# Patient Record
Sex: FEMALE | Race: WHITE
[De-identification: names, ages, dates, MRNs, and addresses within clinical notes are randomized per-mention and may not be internally consistent; named-entity substitution may affect disease eponyms.]

---

## 2018-05-12 ENCOUNTER — HOSPITAL ENCOUNTER (EMERGENCY)
Dept: HOSPITAL 25 - UCCORT | Age: 58
Discharge: HOME | End: 2018-05-12
Payer: COMMERCIAL

## 2018-05-12 VITALS — DIASTOLIC BLOOD PRESSURE: 82 MMHG | SYSTOLIC BLOOD PRESSURE: 122 MMHG

## 2018-05-12 DIAGNOSIS — N30.00: Primary | ICD-10-CM

## 2018-05-12 DIAGNOSIS — Z88.2: ICD-10-CM

## 2018-05-12 PROCEDURE — 99212 OFFICE O/P EST SF 10 MIN: CPT

## 2018-05-12 PROCEDURE — 81003 URINALYSIS AUTO W/O SCOPE: CPT

## 2018-05-12 PROCEDURE — G0463 HOSPITAL OUTPT CLINIC VISIT: HCPCS

## 2018-05-12 NOTE — UC
Complaint Female HPI





- HPI Summary


HPI Summary: 





58 yo WF p/w suprapubic pressure x 2-3 days, associated with urinary frequency 

and urgency, denies f/c





- History Of Current Complaint


Chief Complaint: UCGU


Stated Complaint: URINARY


Time Seen by Provider: 05/12/18 09:02


Hx Obtained From: Patient


Hx Last Menstrual Period: n/a


Pregnant?: Yes


Onset/Duration: Sudden Onset


Timing: Constant


Severity Initially: Moderate


Severity Currently: Moderate


Pain Intensity: 5


Pain Scale Used: 0-10 Numeric


Character: Sharp





- Allergies/Home Medications


Allergies/Adverse Reactions: 


 Allergies











Allergy/AdvReac Type Severity Reaction Status Date / Time


 


Sulfa (Sulfonamide Allergy  Unknown Verified 05/12/18 08:42





Antibiotics)   Reaction  





   Details  














PMH/Surg Hx/FS Hx/Imm Hx


Previously Healthy: Yes


Cardiovascular History: Hypertension





- Surgical History


Surgical History: Yes


Surgery Procedure, Year, and Place: Right Knee Arthroscopy, 2017, [place]; 

Left Shoulder, 2016, [place]; Tonsillectomy as a child





- Family History


Known Family History: Positive: Cardiac Disease, Hypertension





- Social History


Alcohol Use: None


Substance Use Type: None


Smoking Status (MU): Never Smoked Tobacco





Review of Systems


Constitutional: Negative


Skin: Negative


Eyes: Negative


ENT: Negative


Respiratory: Negative


Cardiovascular: Negative


Gastrointestinal: Negative


Genitourinary: Frequency, Urgency, Other - suprapubic pain


Motor: Negative


Neurovascular: Negative


Musculoskeletal: Negative


Neurological: Negative


Psychological: Negative


All Other Systems Reviewed And Are Negative: Yes





Physical Exam


Triage Information Reviewed: Yes


Appearance: Well-Appearing


Vital Signs: 


 Initial Vital Signs











Temp  37.2 C   05/12/18 08:39


 


Pulse  82   05/12/18 08:39


 


Resp  16   05/12/18 08:39


 


BP  122/82   05/12/18 08:39


 


Pulse Ox  99   05/12/18 08:39











Eye Exam: Normal


ENT Exam: Normal


Dental Exam: Normal


Neck: Positive: Supple


Respiratory Exam: Normal


Cardiovascular Exam: Normal


Cardiovascular: Positive: RRR


Abdomen Description: Positive: Other: - suprapubic tenderness.  Negative: CVA 

Tenderness (R), CVA Tenderness (L)


Musculoskeletal Exam: Normal


Neurological Exam: Normal


Neurological: Positive: Alert


Psychological Exam: Normal


Skin Exam: Normal





 Complaint Female Dx





- Course


Course Of Treatment: UA - 3+ blood and signs of UTI, will tx with macrobid





- Differential Dx/Diagnosis


Provider Diagnoses: acute cystitis





Discharge





- Sign-Out/Discharge


Documenting (check all that apply): Discharge/Admit/Transfer





- Discharge Plan


Condition: Stable


Disposition: HOME


Prescriptions: 


Nitrofurantoin Monohyd/M-Cryst [Macrobid 100 mg Capsule] 100 mg PO BID 7 Days #

14 cap


Patient Education Materials:  Urinary Tract Infection in Women (ED)


Referrals: 


Clotilde Walters MD [Primary Care Provider] - 





- Billing Disposition and Condition


Condition: STABLE


Disposition: HOME Pt states yesterday she started coughing up bright red blood. Then states that this evening she was riding in a car and began coughing bright red blood again. Denies SOA, Cp or dizziness. Family at bedside.      Fatou Saldivar RN  05/11/18 [id number]

## 2018-05-20 ENCOUNTER — HOSPITAL ENCOUNTER (EMERGENCY)
Dept: HOSPITAL 25 - UCCORT | Age: 58
Discharge: HOME | End: 2018-05-20
Payer: COMMERCIAL

## 2018-05-20 VITALS — SYSTOLIC BLOOD PRESSURE: 139 MMHG | DIASTOLIC BLOOD PRESSURE: 52 MMHG

## 2018-05-20 DIAGNOSIS — Z88.2: ICD-10-CM

## 2018-05-20 DIAGNOSIS — L25.9: Primary | ICD-10-CM

## 2018-05-20 DIAGNOSIS — R31.29: ICD-10-CM

## 2018-05-20 PROCEDURE — 81003 URINALYSIS AUTO W/O SCOPE: CPT

## 2018-05-20 PROCEDURE — 99212 OFFICE O/P EST SF 10 MIN: CPT

## 2018-05-20 PROCEDURE — G0463 HOSPITAL OUTPT CLINIC VISIT: HCPCS

## 2018-05-20 PROCEDURE — 96372 THER/PROPH/DIAG INJ SC/IM: CPT

## 2018-05-20 NOTE — UC
Skin Complaint HPI





- HPI Summary


HPI Summary: 





58 yo female with pruritic rash x 1 day


worse on arms/legs


has been doing yard work





seen here 2 weeks ago with hematuria











- History of Current Complaint


Chief Complaint: UCRash


Time Seen by Provider: 05/20/18 09:22


Stated Complaint: SKIN COMPLAINT


Hx Obtained From: Patient


Hx Last Menstrual Period: n/a


Onset/Duration: Gradual Onset, Lasting Hours


Timing: Constant


Onset Severity: Mild


Current Severity: Mild


Pain Intensity: 0


Pain Scale Used: 0-10 Numeric


Location: Diffuse


Character: Pruritus, Redness, Raised


Aggravating Factor(s): Nothing


Alleviating Factor(s): Nothing





- Allergy/Home Medications


Allergies/Adverse Reactions: 


 Allergies











Allergy/AdvReac Type Severity Reaction Status Date / Time


 


Sulfa (Sulfonamide Allergy  Unknown Verified 05/20/18 09:09





Antibiotics)   Reaction  





   Details  











Home Medications: 


 Home Medications





Triamterene/HCTZ 37.5-25 MG* [Dyazide CAP*] 1 cap PO DAILY 05/20/18 [History 

Confirmed 05/20/18]











Review of Systems


Constitutional: Negative


Skin: Rash


Eyes: Negative


ENT: Negative


Respiratory: Negative


Cardiovascular: Negative


Gastrointestinal: Negative


Genitourinary: Negative


Motor: Negative


Neurovascular: Negative


Musculoskeletal: Negative


Neurological: Negative


Psychological: Negative


Is Patient Immunocompromised?: No


All Other Systems Reviewed And Are Negative: Yes





PMH/Surg Hx/FS Hx/Imm Hx


Previously Healthy: Yes





- Surgical History


Surgical History: Yes


Surgery Procedure, Year, and Place: Right Knee Arthroscopy, 2017, [place]; 

Left Shoulder, 2016, [place]; Tonsillectomy as a child





- Family History


Known Family History: Positive: Cardiac Disease, Hypertension





- Social History


Alcohol Use: None


Substance Use Type: None


Smoking Status (MU): Never Smoked Tobacco





Physical Exam


Triage Information Reviewed: Yes


Appearance: Well-Appearing, No Pain Distress, Well-Nourished


Vital Signs: 


 Initial Vital Signs











Temp  97.9 F   05/20/18 09:12


 


Pulse  99   05/20/18 09:12


 


Resp  18   05/20/18 09:12


 


BP  139/52   05/20/18 09:12


 


Pulse Ox  97   05/20/18 09:12











Vital Signs Reviewed: Yes


Eyes: Positive: Conjunctiva Clear


ENT: Positive: Other - no intraoral lesions.  Negative: Hearing grossly normal, 

Nasal congestion, Nasal drainage, Trismus, Muffled voice, Dental tenderness


Neck: Positive: Supple, Nontender


Respiratory: Positive: Lungs clear, Normal breath sounds, No respiratory 

distress


Cardiovascular: Positive: RRR, No Murmur


Musculoskeletal: Positive: ROM Intact, No Edema


Neurological: Positive: Alert


Psychological Exam: Normal


Skin Exam: Other - muliple red papules arms and legs some in linear array





Course/Dx





- Course


Course Of Treatment: ua-(+1) rbcs





- Diagnoses


Provider Diagnoses: contact dermatitis.  microscopic hematuria





Discharge





- Sign-Out/Discharge


Documenting (check all that apply): Discharge/Admit/Transfer





- Discharge Plan


Condition: Stable


Disposition: HOME


Patient Education Materials:  Contact Dermatitis (ED), Hematuria (ED)


Referrals: 


Clotilde Walters MD [Primary Care Provider] - 4 Days





- Billing Disposition and Condition


Condition: STABLE


Disposition: HOME

## 2019-11-20 ENCOUNTER — HOSPITAL ENCOUNTER (EMERGENCY)
Dept: HOSPITAL 25 - UCCORT | Age: 59
Discharge: HOME | End: 2019-11-20
Payer: COMMERCIAL

## 2019-11-20 VITALS — SYSTOLIC BLOOD PRESSURE: 131 MMHG | DIASTOLIC BLOOD PRESSURE: 62 MMHG

## 2019-11-20 DIAGNOSIS — F41.9: ICD-10-CM

## 2019-11-20 DIAGNOSIS — Z88.2: ICD-10-CM

## 2019-11-20 DIAGNOSIS — R05: Primary | ICD-10-CM

## 2019-11-20 DIAGNOSIS — R06.02: ICD-10-CM

## 2019-11-20 DIAGNOSIS — J34.89: ICD-10-CM

## 2019-11-20 DIAGNOSIS — Z79.899: ICD-10-CM

## 2019-11-20 PROCEDURE — 71046 X-RAY EXAM CHEST 2 VIEWS: CPT

## 2019-11-20 PROCEDURE — 99212 OFFICE O/P EST SF 10 MIN: CPT

## 2019-11-20 PROCEDURE — G0463 HOSPITAL OUTPT CLINIC VISIT: HCPCS

## 2019-11-20 NOTE — XMS REPORT
Continuity of Care Document (CCD)

 Created on:October 15, 2019



Patient:Jennie Garcia

Sex:Female

:1960

External Reference #:[id number]





Demographics







 Address  [address] [place] [address]

 

 Home Phone  [phone]

 

 Mobile Phone  [phone]

 

 Email Address  [email or web address]

 

 Preferred Language  en

 

 Marital Status  Not  or 

 

 Oriental orthodox Affiliation  Unknown

 

 Race  White

 

 Ethnic Group  Not  or 









Author







 Name  Clotilde Walters MD

 

 Address  [address] [place] [id number]









Care Team Providers







 Name  Role  Phone

 

 Clotilde Walters MD - Family Medicine  Care Team Information   [phone]
[phone]









Problems







 Active Problems  Provider  Date

 

 Benign essential hypertension    Onset: 2016

 

 Edema  Clotilde Walters MD  Onset: 2015

 

 Skin sensation disturbance  Clotilde Walters MD  Onset: 2015

 

 Psoriasis  Clotilde Walters MD  Onset: 2016

 

 Overweight  Clotilde Walters MD  Onset: 2016

 

 Derangement of medial meniscus  Martin Hernandez M.D.  Onset: 2017

 

 Old tear of posterior horn of medial  Arlene Romero PA  Onset: 2017



 meniscus    

 

 Localized, primary osteoarthritis  Arlene Romero PA  Onset: 2017

 

 Hematuria syndrome  Dick Doss FNP  Onset: 2018









 Note: Lab: 18 - Urine Dipstick Lab: 18 - Urine Culture negative









 Verruca vulgaris  Clotilde Walters MD  Onset: 2018

 

 Screening for malignant neoplasm of colon  Pasha Campos MD  Onset: 2018

 

 Constipation  Pasha Campos MD  Onset: 2018

 

 Other hemorrhoids  Pasha Campos MD  Onset: 2018

 

 Adjustment disorder with depressed mood  Clotilde Walters MD  Onset: 2019







Social History







 Type  Date  Description  Comments

 

 Birth Sex    Unknown  

 

 Tobacco Use  Start: Unknown  Never Smoked Cigarettes  

 

 Smoking Status  Reviewed: 10/15/19  Never Smoked Cigarettes  

 

 ETOH Use    Currently consumes alcohol  < 2 dr/yr

 

 Tobacco Use  Start: Unknown  Patient has never smoked  

 

 Recreational Drug Use    Never Used Drugs  

 

 Exercise Type/Frequency    Does not exercise  







Allergies, Adverse Reactions, Alerts







 Active Allergies  Reaction  Severity  Comments  Date

 

 Sulfa Drugs        2015







Medications







 Active Medications  SIG  Qnty  Indications  Ordering Provider  Date

 

 Fluoxetine HCL  1 by mouth every  30caps  F43.21  Clotilde Waltres,  2019



 (PMDD)  night      MD  



       20mg Capsules          



           

 

 Lorazepam  1 tablet by mouth  60tabs    Clotilde Walters,  2019



          1mg Tablets  3x/day as needed      MD  



   for acute grief        



   reaction Reference        



   #: [id number]        

 

 Triamterene/Hydrochl  take one tablet by  90tabs  R03.0  Clotilde Walters,  



 orothiazide  mouth once daily      MD  



            75-50mg  in the morning        



 Tablets          



           

 

 Ibuprofen  take one tablet by    S46.012A  Unknown  



          800mg  mouth every 8        



 Tablets  hours as needed        



           









 History Medications









 Amoxicillin/Clavulanate  1 by mouth  20tabs    Clotilde Walters,  2019 -



 Potassium  twice a day      MD  10/11/2019



 875-125mg Tablets          



           

 

 Amoxicillin/Clavulanate  1 by mouth  20tabs    Clotilde Walters,  2019 -



 Potassium  twice a day      MD  2019



 875-125mg Tablets          



           







Immunizations







 Description

 

 No Information Available







Vital Signs







 Date  Vital  Result  Comment

 

 10/11/2019  9:09am  BP Systolic  120 mmHg  









 BP Diastolic  74 mmHg  

 

 Body Temperature  98.3 F  

 

 Heart Rate  83 /min  

 

 Respiratory Rate  18 /min  

 

 Height  64.75 inches  5'4.75"

 

 Weight  234.00 lb  

 

 BMI (Body Mass Index)  39.2 kg/m2  

 

 BSA (Body Surface Area)  2.11 m2  

 

 Ideal body weight in kilograms  56 kg  

 

 O2 % BldC Oximetry  97 %  









 2019  3:11pm  BP Systolic  126 mmHg  









 BP Diastolic  80 mmHg  

 

 Height  65.25 inches  5'5.25"

 

 Weight  233.00 lb  

 

 BMI (Body Mass Index)  38.5 kg/m2  

 

 BSA (Body Surface Area)  2.12 m2  

 

 Ideal body weight in kilograms  57 kg  







Results







 Test  Date  Facility  Test  Result  H/L  Range  Note

 

 Urine Dipstick  10/11/2019  [facility]  Ua Color  yellow    Yellow  









 Ua Clarity  clear    Clear  

 

 [facility]a Leuko  negative    Negative  

 

 Ua Nitrite  negative    Negative  

 

 Ua Urobilinogen  0.2    0.2 - 1.0 E.U./dL  

 

 U[facility] Protein  negative    Negative  

 

 Ua PH  7.5    6.5-7.5  

 

 Ua Blood  negative    Negative  

 

 Ua Specific Gravity  1.015    1.010-1.030  

 

 Ua Ketones  negative    Negative  

 

 Ua Bilirubin  negative    Negative  

 

 Ua Glucose  negative    Negative  









 Urine Dipstick  2019  [facility]  Ua Leuko  -    Negative  









 Ua Nitrite  -    Negative  

 

 Ua Urobilinogen  .2    0.2 - 1.0 E.U./dL  

 

 Ua Protein  -    Negative  

 

 Ua PH  7    6.5-7.5  

 

 Ua Blood  1+  High  Negative  

 

 Ua Specific Gravity  1.015    1.010-1.030  

 

 Ua Ketones  -    Negative  

 

 Ua Bilirubin  -    Negative  

 

 Ua Glucose  -    Negative  







Procedures







 Date  Code  Description  Status

 

 10/11/2019  87456570  Mammogram  Completed

 

 2019  [id number]  Colonoscopy  Completed

 

 2019  53810  Brief Emotional/Behav Assessment W/ Scoring Doc Per  
Completed



     Standard Inst  

 

 2018  [id number]  Bone Mineral Density Test  Completed







Medical Devices







 Description

 

 No Information Available







Encounters







 Type  Date  Location  Provider  Dx  Diagnosis

 

 Office Visit  10/11/2019  [facility]  Clotilde Walters,  Z00.00  Encntr for 
general



   9:00a  Darshan VALLE MD    adult medical exam



           w/o abnormal



           findings









 F43.21  Adjustment disorder with depressed mood

 

 K57.30  Dvrtclos of lg int w/o perforation or abscess w/o bleeding

 

 N60.02  Solitary cyst of left breast









 Office Visit  2019  3:00p  [facility]  Clotilde Walters  F43.21  
Adjustment



     Darshan VALLE MD    disorder with



           depressed mood







Assessments







 Date  Code  Description  Provider

 

 10/11/2019  Z00.00  Encounter for general adult medical examination  Clotilde Walters MD



     without abnormal findings  

 

 10/11/2019  F43.21  Adjustment disorder with depressed mood  Clotilde Walters MD

 

 10/11/2019  K57.30  Diverticulosis of large intestine without  Clotilde Walters MD



     perforation or abscess without bleeding  

 

 10/11/2019  N60.02  Solitary cyst of left breast  Clotilde Walters MD

 

 2019  F43.21  Adjustment disorder with depressed mood  Clotilde Walters MD







Plan of Treatment

Future Appointment(s):2020  8:30 am - Clotilde Walters MD at [facility] Darshan VALLE10/2019 - Clotilde Walters MDZ00.00 Encounter for general 
adult medical examination without abnormal findingsComments:INCREASE EXERCISE; 
WORK TOWARDS IDEAL WEIGHT;DAILY SUNSCREEN FOR SKIN CANCER PREVENTION;CALCIUM 
INTAKE DISCUSSEDPAP/PELVIC : UP TO DATE; MAMMOGRAM AND SONOGRAM LEFT BREAST 
WILL BE ORDERED FOR AFTER 10/19/19; LABS  ORDERED FASTING;TDAP [facility] 
PROXY: IN ZUYYLH71.21 Adjustment disorder with depressed moodComments:YOU ARE 
BETTER; I'M GLAD YOU WENT TO [place]. PLAN MORE TRIPS AND GO!LORAZEPAM AS 
NEEDED.Follow up:4 MOK57.30 Diverticulosis of large intestine without 
perforation or abscess without bleedingComments:FROM YOUR COLONOSCOPY;BASICALLY
, AVOID BPAWZYLEWYFCJ21.02 Solitary cyst of left breastComments:PLAN AS ABOVE



Functional Status







 Functional Condition  Comment  Date  Status

 

 Glasses      Active







Mental Status







 Description

 

 No Information Available







Referrals







 Description

 

 No Information Available

## 2019-11-20 NOTE — UC
Respiratory Complaint HPI





- HPI Summary


HPI Summary: 


Patient is a 59-year-old female presenting with complaint of SOB at night for 

the past 3 nights and cough x1 month. Patient states she wakes up and feels 

like she "cant catch her breath." Notes that it lasts a few minutes and 

improves some with mouth breathing. Notes mild dry cough. Notes post nasal drip 

and h/o seasonal allergies. Denies SOB during the day. Denies wheezing. Denies 

chest pain. Denies fever and chills. Denies decreased appetite and fatigue. 

Patient is not a smoker. Patient states she is concerned for cancer because her 

  in July from cancer that initially presented as a mild dry cough. 

Patient notes she has been in mourning since and "cries every day." States she 

takes ativan as needed "a couple times a week" for anxiety since her 's 

passing.








- History of Current Complaint


Chief Complaint: UCGeneralIllness


Stated Complaint: COUGH,SOB


Hx Obtained From: Patient


Hx Last Menstrual Period: n/a


Pain Intensity: 0





- Allergies/Home Medications


Allergies/Adverse Reactions: 


 Allergies











Allergy/AdvReac Type Severity Reaction Status Date / Time


 


Sulfa (Sulfonamide Allergy  Unknown Verified 19 09:15





Antibiotics)   Reaction  





   Details  











Home Medications: 


 Home Medications





LORazepam [Ativan 1 MG TAB] 1 tab PO ONCE PRN 19 [History Confirmed ]











PMH/Surg Hx/FS Hx/Imm Hx


Previously Healthy: Yes





- Surgical History


Surgical History: Yes


Surgery Procedure, Year, and Place: Right Knee Arthroscopy, , [place];.  

Left Shoulder, , [place];.  Tonsillectomy as a child





- Family History


Known Family History: Positive: Cardiac Disease, Hypertension





- Social History


Alcohol Use: None


Substance Use Type: None


Smoking Status (MU): Never Smoked Tobacco





Review of Systems


All Other Systems Reviewed And Are Negative: Yes


Constitutional: Positive: Negative


ENT: Positive: Nasal Discharge - PND, Sinus Congestion.  Negative: Sore Throat, 

Ear Ache


Respiratory: Positive: Shortness Of Breath - at night, Cough - dry


Cardiovascular: Positive: Negative


Gastrointestinal: Positive: Negative


Musculoskeletal: Positive: Negative


Neurological: Positive: Negative


Psychological: Positive: Anxious, Depressed





Physical Exam


Triage Information Reviewed: Yes


Appearance: Well-Appearing, No Pain Distress, Well-Nourished


Vital Signs: 


 Initial Vital Signs











Temp  99.6 F   19 09:16


 


Pulse  78   19 09:16


 


Resp  16   19 09:16


 


BP  131/62   19 09:16


 


Pulse Ox  97   19 09:16











Vital Signs Reviewed: Yes


Eyes: Positive: Conjunctiva Clear


ENT: Positive: Hearing grossly normal, Nasal drainage - PND noted, TMs normal, 

Uvula midline.  Negative: Pharyngeal erythema, Tonsillar swelling, Tonsillar 

exudate


Neck exam: Normal


Neck: Positive: Supple, Nontender, No Lymphadenopathy


Respiratory Exam: Normal


Respiratory: Positive: Lungs clear, Normal breath sounds, No respiratory 

distress.  Negative: Crackles, Rhonchi, Stridor, Wheezing


Cardiovascular Exam: Normal


Cardiovascular: Positive: RRR


Neurological: Positive: Alert


Psychological: Positive: Age Appropriate Behavior


Skin Exam: Normal





Diagnostics





- Radiology


  ** chest


Radiology Interpretation Completed By: Radiologist


Summary of Radiographic Findings: IMPRESSION: NO EVIDENCE FOR ACTIVE 

CARDIOPULMONARY DISEASE.





Respiratory Course/Dx





- Course


Course Of Treatment: 


Patient CXR negative. Educated on allergies or bronchitis as source of cough. 

Also discussed anxiety attacks as possible source of SOB at night. Instructed 

patient to take otc allergy mediation. Instructed patient to follow up with pcp 

if sob attacks continue or go to ED if they worsen. Patient voiced 

understanding and agreed with treatment plan.








- Differential Dx/Diagnosis


Provider Diagnosis: 


 Waking at night short of breath, Persistent dry cough








Discharge ED





- Sign-Out/Discharge


Documenting (check all that apply): Patient Departure


All imaging exams completed and their final reports reviewed: Yes





- Discharge Plan


Condition: Stable


Disposition: HOME


Prescriptions: 


Guaifenesin/Pseudoephedrne HCl [Mucinex D ER 1,200-120 mg Tab] 1 each PO DAILY 

PRN #20 tab.er.12h


 PRN Reason: Congestion


Patient Education Materials:  Dyspnea (ED), Postnasal Drip (DC)


Referrals: 


Clotilde Walters MD [Primary Care Provider] - If Needed


Additional Instructions: 


As discussed, your chest xray was normal today.


You may take mucinex to help reduce mucus production.


You may also use Flonase spray and over the counter allergy medication, such as 

Zyrtec, to help alleviate nasal congestion.


Follow up with your primary care doctor if your symptoms do not resolve within 

7 days.


Go to the Emergency Room if your symptoms worsen, including difficulty breathing

, chest pain, and fever.








- Billing Disposition and Condition


Condition: STABLE


Disposition: Home

## 2020-02-25 ENCOUNTER — HOSPITAL ENCOUNTER (EMERGENCY)
Dept: HOSPITAL 25 - UCCORT | Age: 60
Discharge: HOME | End: 2020-02-25
Payer: COMMERCIAL

## 2020-02-25 VITALS — SYSTOLIC BLOOD PRESSURE: 128 MMHG | DIASTOLIC BLOOD PRESSURE: 82 MMHG

## 2020-02-25 DIAGNOSIS — Z88.2: ICD-10-CM

## 2020-02-25 DIAGNOSIS — I10: ICD-10-CM

## 2020-02-25 DIAGNOSIS — H92.09: ICD-10-CM

## 2020-02-25 DIAGNOSIS — R05: ICD-10-CM

## 2020-02-25 DIAGNOSIS — J32.9: Primary | ICD-10-CM

## 2020-02-25 DIAGNOSIS — Z79.899: ICD-10-CM

## 2020-02-25 DIAGNOSIS — E66.9: ICD-10-CM

## 2020-02-25 PROCEDURE — G0463 HOSPITAL OUTPT CLINIC VISIT: HCPCS

## 2020-02-25 PROCEDURE — 99212 OFFICE O/P EST SF 10 MIN: CPT

## 2020-02-25 NOTE — UC
Throat Pain/Nasal Rubén HPI





- HPI Summary


HPI Summary: 





sinus pain / pressure x 10 days


yellow / green nasal discharge, pnd 


cough , having low grade fever, chills,


not improving with otc meds








- History of Current Complaint


Chief Complaint: UCRespiratory


Time Seen by Provider: 02/25/20 08:28


Hx Obtained From: Patient


Hx Last Menstrual Period: n/a


Onset/Duration: Gradual Onset, Lasting Days - 10, Still Present


Severity: Moderate


Pain Intensity: 4


Cough: Nonproductive


Associated Signs & Symptoms: Positive: Sinus Discomfort, Nasal Discharge, 

Fever.  Negative: Wheezing, Vomiting, Rash





- Allergies/Home Medications


Allergies/Adverse Reactions: 


 Allergies











Allergy/AdvReac Type Severity Reaction Status Date / Time


 


Sulfa (Sulfonamide Allergy  Unknown Verified 02/25/20 08:18





Antibiotics)   Reaction  





   Details  











Home Medications: 


 Home Medications





Triamterene/HCTZ 37.5-25 MG* [Dyazide CAP*] 1 cap PO DAILY 05/20/18 [History 

Confirmed 02/25/20]


LORazepam [Ativan 1 MG TAB] 1 tab PO ONCE PRN 11/20/19 [History Confirmed 02/25/ 20]


Amoxicillin/Clavulanate TAB* [Augmentin *] 875 mg PO BID #20 tab 02/25/ 20 [Rx]


Ibuprofen 800 mg PO DAILY 02/25/20 [History Confirmed 02/25/20]











PMH/Surg Hx/FS Hx/Imm Hx


Cardiovascular History: Hypertension





- Surgical History


Surgical History: Yes


Surgery Procedure, Year, and Place: Right Knee Arthroscopy, 2017, [place];.  

Left Shoulder, 2016, [place];.  Tonsillectomy as a child





- Family History


Known Family History: Positive: Cardiac Disease, Hypertension





- Social History


Alcohol Use: None


Substance Use Type: None


Smoking Status (MU): Never Smoked Tobacco





Review of Systems


All Other Systems Reviewed And Are Negative: Yes


Constitutional: Positive: Fever, Chills, Fatigue


Skin: Positive: Negative


Eyes: Positive: Negative


ENT: Positive: Sore Throat, Ear Ache, Nasal Discharge


Respiratory: Positive: Cough


Cardiovascular: Positive: Negative


Is Patient Immunocompromised?: No





Physical Exam


Triage Information Reviewed: Yes


Appearance: Well-Appearing, No Pain Distress, Obese


Vital Signs: 


 Initial Vital Signs











Temp  99.6 F   02/25/20 08:20


 


Pulse  85   02/25/20 08:20


 


Resp  22   02/25/20 08:20


 


BP  128/82   02/25/20 08:20


 


Pulse Ox  96   02/25/20 08:20











Vital Signs Reviewed: Yes


Eye Exam: Normal


Eyes: Positive: Conjunctiva Clear


ENT: Positive: Normal ENT inspection, Hearing grossly normal, Pharynx normal, 

Nasal congestion, Nasal drainage, TMs normal, Sinus tenderness.  Negative: TM 

bulging, TM dull, TM red


Neck: Positive: Supple, Nontender, No Lymphadenopathy


Respiratory: Positive: Chest non-tender, Lungs clear, Normal breath sounds


Cardiovascular: Positive: RRR, No Murmur, Pulses Normal


Skin Exam: Normal





Throat Pain/Nasal Course/Dx





- Differential Dx/Diagnosis


Provider Diagnosis: 


 Sinusitis








Discharge ED





- Sign-Out/Discharge


Documenting (check all that apply): Patient Departure


All imaging exams completed and their final reports reviewed: No Studies





- Discharge Plan


Condition: Stable


Disposition: HOME


Prescriptions: 


Amoxicillin/Clavulanate TAB* [Augmentin *] 875 mg PO BID #20 tab


Patient Education Materials:  Sinusitis (ED)


Referrals: 


Clotilde Walters MD [Primary Care Provider] - If Needed





- Billing Disposition and Condition


Condition: STABLE


Disposition: Home negative...

## 2020-02-25 NOTE — XMS REPORT
Continuity of Care Document (CCD)

 Created on:2020



Patient:Jennie Garcia

Sex:Female

:1960

External Reference #:[id number]





Demographics







 Address  [address] [place] [address]

 

 Home Phone  [phone]

 

 Mobile Phone  [phone]

 

 Email Address  [email or web address]

 

 Preferred Language  en

 

 Marital Status  Not  or 

 

 Faith Affiliation  Unknown

 

 Race  White

 

 Ethnic Group  Not  or 









Author







 Name  Clotilde Walters MD

 

 Address  [address] [place] [id number]









Care Team Providers







 Name  Role  Phone

 

 Clotilde Walters MD - Family Medicine  Care Team Information   [phone]
[phone]









Problems







 Active Problems  Provider  Date

 

 Benign essential hypertension    Onset: 2016

 

 Edema  Clotilde Walters MD  Onset: 2015

 

 Skin sensation disturbance  Clotilde Walters MD  Onset: 2015

 

 Psoriasis  Clotilde Walters MD  Onset: 2016

 

 Overweight  Clotilde Walters MD  Onset: 2016

 

 Derangement of medial meniscus  Martin Hernandez M.D.  Onset: 2017

 

 Old tear of posterior horn of medial  Arlene Romero PA  Onset: 2017



 meniscus    

 

 Localized, primary osteoarthritis  Arlene Romero PA  Onset: 2017

 

 Hematuria syndrome  Dick Doss FNP  Onset: 2018









 Note: Lab: 18 - Urine Dipstick Lab: 18 - Urine Culture negative









 Verruca vulgaris  Clotilde Walters MD  Onset: 2018

 

 Screening for malignant neoplasm of colon  Pasha Campos MD  Onset: 2018

 

 Constipation  Pasha Campos MD  Onset: 2018

 

 Other hemorrhoids  Pasha Campos MD  Onset: 2018

 

 Adjustment disorder with depressed mood  Clotilde Walters MD  Onset: 2019

 

 Disorder of shoulder  Clotilde Walters MD  Onset: 2019







Social History







 Type  Date  Description  Comments

 

 Birth Sex    Unknown  

 

 Tobacco Use  Start: Unknown  Never Smoked Cigarettes  

 

 Smoking Status  Reviewed: 20  Never Smoked Cigarettes  

 

 ETOH Use    Currently consumes alcohol  < 2 dr/yr

 

 Tobacco Use  Start: Unknown  Patient has never smoked  

 

 Recreational Drug Use    Never Used Drugs  

 

 Exercise Type/Frequency    Does not exercise  







Allergies, Adverse Reactions, Alerts







 Active Allergies  Reaction  Severity  Comments  Date

 

 Sulfa Drugs        2015







Medications







 Active Medications  SIG  Qnty  Indications  Ordering  Date



         Provider  

 

 Trazodone HCL  1/2  tablet by  30tabs  F43.21  Clotilde Walters,  2019



              100mg  mouth at bedtime      MD  



 Tablets  for 5 days, then        



   increase to 1        



   whole tab at        



   bedtime        

 

 Lorazepam  1 tablet by mouth  60tabs    Clotilde Walters,  2019



          1mg Tablets  3x/day as needed      MD  



   for anxiety        



   Reference #:        



   [id number]        

 

 Triamterene/Hydrochl  take one tablet by  90tabs  R03.0  Clotilde Walters,  



 orothiazide  mouth once daily      MD  



            75-50mg  in the morning        



 Tablets          



           

 

 Ibuprofen  take one tablet by  100tabs  S46.012A  Clotilde Walters,  



          800mg  mouth every 8      MD  



 Tablets  hours with food or        



   snack as needed        



   for pain        









 History Medications









 Fluoxetine HCL  1 cap by mouth  30caps    Clotilde Walters,  2019 -



 (PMDD)  every day along      MD  2019



       10mg Capsules  with 20 mg dose        



           

 

 Fluoxetine HCL  1 cap by mouth  30caps    Clotilde Walters,  2019 -



 (PMDD)  every day along      MD  2019



       20mg Capsules  with the 10 mg        



   dose        

 

 Amoxicillin/Clavulan  1 by mouth  20tabs    Clotilde Walters,  2019 -



 ate Potassium  twice a day      MD  10/11/2019



           



 875-125mg Tablets          



           

 

 Fluoxetine HCL  1 1/2 by mouth  45caps  F43.21  Clotilde Walters,  2019 -



 (PMDD)  every night      MD  2019



       20mg Capsules          



           







Immunizations







 Description

 

 No Information Available







Vital Signs







 Date  Vital  Result  Comment

 

 2020  5:20pm  BP Systolic Sitting Left Arm  126 mmHg  









 BP Diastolic Sitting Left Arm  76 mmHg  

 

 Body Temperature  99.1 F  

 

 Heart Rate  80 /min  

 

 Respiratory Rate  18 /min  

 

 Height  64.75 inches  5'4.75"

 

 Weight  239.00 lb  

 

 BMI (Body Mass Index)  40.1 kg/m2  

 

 BSA (Body Surface Area)  2.13 m2  

 

 Ideal body weight in kilograms  56 kg  

 

 O2 % BldC Oximetry  97 %  









 2019 10:44am  BP Systolic  130 mmHg  









 BP Diastolic  82 mmHg  

 

 Body Temperature  98.3 F  

 

 Heart Rate  69 /min  

 

 Respiratory Rate  16 /min  

 

 Height  64.75 inches  5'4.75"

 

 Weight  236.00 lb  

 

 BMI (Body Mass Index)  39.6 kg/m2  

 

 BSA (Body Surface Area)  2.12 m2  

 

 Ideal body weight in kilograms  56 kg  

 

 O2 % BldC Oximetry  98 %  







Results







 Test  Acquired Date  Facility  Test  Result  H/L  Range  Note

 

 Xray  2020  [facility]  Ultrasound, Breast  <pending>      



     [address]  Unilateral [place], [place] [id number] [phone]          









 Mammography, Screening Bilateral Mammogram  <pending>      









 Urine Dipstick  2019  [facility]  Ua Color  yellow    Yellow  









 Ua Clarity  clear    Clear  

 

 Ua Leuko  neg    Negative  

 

 Ua Nitrite  neg    Negative  

 

 Ua Urobilinogen  3.5  High  0.2 - 1.0 E.U./dL  

 

 Ua Protein  neg    Negative  

 

 Ua PH  6.5    6.5-7.5  

 

 Ua Blood  pos    Negative  

 

 Ua Specific Gravity  1.020    1.010-1.030  

 

 Ua Ketones  neg    Negative  

 

 Ua Bilirubin  neg    Negative  

 

 Ua Glucose  neg    Negative  









 Urine Dipstick  10/11/2019  [facility]  Ua Color  yellow    Yellow  









 Ua Clarity  clear    Clear  

 

 Ua Leuko  negative    Negative  

 

 Ua Nitrite  negative    Negative  

 

 Ua Urobilinogen  0.2    0.2 - 1.0 E.U./dL  

 

 Ua Protein  negative    Negative  

 

 Ua PH  7.5    6.5-7.5  

 

 Ua Blood  negative    Negative  

 

 Ua Specific Gravity  1.015    1.010-1.030  

 

 Ua Ketones  negative    Negative  

 

 Ua Bilirubin  negative    Negative  

 

 Ua Glucose  negative    Negative  









 Urine Dipstick  2019  [facility]  Ua Leuko  -    Negative  









 Ua Nitrite  -    Negative  

 

 Ua Urobilinogen  .2    0.2 - 1.0 E.U./dL  

 

 Ua Protein  -    Negative  

 

 Ua PH  7    6.5-7.5  

 

 Ua Blood  1+  High  Negative  

 

 Ua Specific Gravity  1.015    1.010-1.030  

 

 Ua Ketones  -    Negative  

 

 Ua Bilirubin  -    Negative  

 

 Ua Glucose  -    Negative  







Procedures







 Date  Code  Description  Status

 

 10/11/2019  14284  Brief Emotional/Behav Assessment W/ Scoring Doc Per  
Completed



     Standard Inst  

 

 10/11/2019  65236092  Mammogram  Completed

 

 2019  54650446  Colonoscopy  Completed

 

 2019  32202  Brief Emotional/Behav Assessment W/ Scoring Doc Per  
Completed



     Standard Inst  

 

 2018  815013623  Bone Mineral Density Test  Completed







Medical Devices







 Description

 

 No Information Available







Encounters







 Type  Date  Location  Provider  Dx  Diagnosis

 

 Office Visit  2020  [facility]  Clotilde Walters,  S23.41xA  Sprain 
of ribs,



   4:30p  Darshan VALLE MD    initial encounter









 F43.21  Adjustment disorder with depressed mood









 Office Visit  2019 10:45a  [facility]  Clotilde Walters  F43.21  
Adjustment



     Darshan VALLE MD    disorder with



           depressed mood









 R07.89  Other chest pain

 

 M75.42  Impingement syndrome of left shoulder

 

 B07.9  Viral wart, unspecified









 Office Visit  10/11/2019  9:00a  [facility]  Clotilde Walters,  Z00.00  
Encntr for



     Darshan VALLE MD    general adult



           medical exam



           w/o abnormal



           findings









 F43.21  Adjustment disorder with depressed mood

 

 K57.30  Dvrtclos of lg int w/o perforation or abscess w/o bleeding

 

 N60.02  Solitary cyst of left breast









 Office Visit  2019  3:00p  [facility]  Clotilde Walters,  F43.21  
Adjustment



     Darshan VALLE MD    disorder with



           depressed mood







Assessments







 Date  Code  Description  Provider

 

 2020  S23.41xA  Sprain of ribs, initial encounter  Clotilde Walters MD

 

 2020  F43.21  Adjustment disorder with depressed mood  Clotilde Walters MD

 

 2019  F43.21  Adjustment disorder with depressed mood  Clotilde Walters MD

 

 2019  R07.89  Other chest pain  Clotilde Walters MD

 

 2019  M75.42  Impingement syndrome of left shoulder  Clotilde Walters MD

 

 2019  B07.9  Viral wart, unspecified  Clotilde Walters MD

 

 10/11/2019  Z00.00  Encounter for general adult medical  Clotilde Walters MD



     examination without abnormal findings  

 

 10/11/2019  F43.21  Adjustment disorder with depressed mood  Clotilde Walters MD

 

 10/11/2019  K57.30  Diverticulosis of large intestine without  Clotilde Walters MD



     perforation or abscess without bleeding  

 

 10/11/2019  N60.02  Solitary cyst of left breast  Clotilde Walters MD

 

 2019  F43.21  Adjustment disorder with depressed mood  Clotilde Walters MD







Plan of Treatment

Future Appointment(s):10/09/2020  9:00 am - Clotilde Walters MD at [facility] RD2020  8:30 am - Clotilde Walters MD at [facility]2020 - Clotilde Walters, MDS23.41xA Sprain of ribs, initial 
encounterNew Xrays:Ribs, Unilateral 2 Views, Ordered: 20Comments:Let's 
get xrays of ribs on both sides.You can use ibuprofen for the pain as needed, I 
have renewed this.Follow up:as wppwdsG09.21 Adjustment disorder with depressed 
moodComments:cont. on current regimen.I renewed your lorazepam.I still think 
you could benefit from counseling.



Functional Status







 Functional Condition  Comment  Date  Status

 

 Glasses      Active







Mental Status







 Description

 

 No Information Available







Referrals







 Description

 

 No Information Available